# Patient Record
Sex: MALE
[De-identification: names, ages, dates, MRNs, and addresses within clinical notes are randomized per-mention and may not be internally consistent; named-entity substitution may affect disease eponyms.]

---

## 2017-12-12 NOTE — REPUSA
CT angiogram of the chest

Clinical statement: Chest pain and shortness of breath.

Technique: Multiple axial CT images were obtained from the thoracic inlet through the upper abdomen a
fter a bolus administration of nonionic intravenous contrast. Coronal and sagittal reconstructions we
re also obtained.

No comparison is available.

Findings: The pulmonary arteries are well-opacified with contrast, with no intraluminal filling defec
ts to suggest embolism. The thoracic aorta is unremarkable. Thyroid gland is within normal limits. Th
ere is no thoracic lymphadenopathy. There are no pericardial or pleural effusions. There is consolida
tion in the right lower lobe. Limited imaging of the upper abdomen is unremarkable. There are bilater
al simple renal cysts. There are no suspicious osseous lesions.

Impression:

1. No evidence of pulmonary embolism.

2. Right lower lobe consolidation, consistent with pneumonia.

     Electronically signed by DIEGO WILSON MD on 12/12/2017 09:56:03 PM ET

## 2017-12-13 NOTE — REP
PA and lateral chest:

 

Comparison is 03/22/2014.

 

There is a new large right pleural effusion.  Left lung is clear.  Cardiac size

is normal.  The shannan, mediastinum, and bony thorax are.

 

Impression:

 

New large right pleural effusion.

 

 

Signed by

Geoffrey Ambrose MD 12/13/2017 07:37 A

## 2018-04-20 ENCOUNTER — HOSPITAL ENCOUNTER (OUTPATIENT)
Dept: HOSPITAL 53 - M RAD | Age: 38
End: 2018-04-20
Payer: COMMERCIAL

## 2018-04-20 DIAGNOSIS — R10.11: Primary | ICD-10-CM
